# Patient Record
Sex: FEMALE | Employment: UNEMPLOYED | URBAN - METROPOLITAN AREA
[De-identification: names, ages, dates, MRNs, and addresses within clinical notes are randomized per-mention and may not be internally consistent; named-entity substitution may affect disease eponyms.]

---

## 2021-03-15 DIAGNOSIS — Z23 NEED FOR VACCINATION: ICD-10-CM

## 2021-06-29 ENCOUNTER — TELEPHONE (OUTPATIENT)
Dept: FAMILY MEDICINE CLINIC | Facility: CLINIC | Age: 71
End: 2021-06-29

## 2021-06-29 NOTE — TELEPHONE ENCOUNTER
Patient is overdue for mammogram. Please call patient and see if there are still a patient in this office they have not been seen since 2014.

## 2021-07-01 NOTE — TELEPHONE ENCOUNTER
This patient is no longer our patient never been seen by Dr. Kalee Morrow. She relocated to [place] in 2017.